# Patient Record
Sex: MALE | Race: WHITE
[De-identification: names, ages, dates, MRNs, and addresses within clinical notes are randomized per-mention and may not be internally consistent; named-entity substitution may affect disease eponyms.]

---

## 2022-10-21 ENCOUNTER — HOSPITAL ENCOUNTER (INPATIENT)
Dept: HOSPITAL 4 - SED | Age: 87
LOS: 3 days | Discharge: ANOTHER HOSPITAL | DRG: 871 | End: 2022-10-24
Attending: INTERNAL MEDICINE | Admitting: INTERNAL MEDICINE
Payer: MEDICARE

## 2022-10-21 VITALS — SYSTOLIC BLOOD PRESSURE: 103 MMHG

## 2022-10-21 VITALS — HEIGHT: 64 IN | WEIGHT: 115 LBS | BODY MASS INDEX: 19.63 KG/M2

## 2022-10-21 VITALS — SYSTOLIC BLOOD PRESSURE: 127 MMHG

## 2022-10-21 VITALS — SYSTOLIC BLOOD PRESSURE: 101 MMHG

## 2022-10-21 VITALS — SYSTOLIC BLOOD PRESSURE: 110 MMHG

## 2022-10-21 VITALS — SYSTOLIC BLOOD PRESSURE: 106 MMHG

## 2022-10-21 VITALS — SYSTOLIC BLOOD PRESSURE: 107 MMHG

## 2022-10-21 VITALS — SYSTOLIC BLOOD PRESSURE: 118 MMHG

## 2022-10-21 DIAGNOSIS — R13.10: ICD-10-CM

## 2022-10-21 DIAGNOSIS — E86.0: ICD-10-CM

## 2022-10-21 DIAGNOSIS — J96.01: ICD-10-CM

## 2022-10-21 DIAGNOSIS — N40.0: ICD-10-CM

## 2022-10-21 DIAGNOSIS — E43: ICD-10-CM

## 2022-10-21 DIAGNOSIS — I11.0: ICD-10-CM

## 2022-10-21 DIAGNOSIS — Z20.822: ICD-10-CM

## 2022-10-21 DIAGNOSIS — Z93.1: ICD-10-CM

## 2022-10-21 DIAGNOSIS — E87.6: ICD-10-CM

## 2022-10-21 DIAGNOSIS — N17.0: ICD-10-CM

## 2022-10-21 DIAGNOSIS — G93.40: ICD-10-CM

## 2022-10-21 DIAGNOSIS — D63.8: ICD-10-CM

## 2022-10-21 DIAGNOSIS — F02.80: ICD-10-CM

## 2022-10-21 DIAGNOSIS — Z86.73: ICD-10-CM

## 2022-10-21 DIAGNOSIS — E78.5: ICD-10-CM

## 2022-10-21 DIAGNOSIS — G20: ICD-10-CM

## 2022-10-21 DIAGNOSIS — J69.0: ICD-10-CM

## 2022-10-21 DIAGNOSIS — I50.43: ICD-10-CM

## 2022-10-21 DIAGNOSIS — Z74.01: ICD-10-CM

## 2022-10-21 DIAGNOSIS — A41.9: Primary | ICD-10-CM

## 2022-10-21 LAB
ALBUMIN SERPL BCP-MCNC: 2.3 G/DL (ref 3.4–4.8)
ALT SERPL W P-5'-P-CCNC: 0 U/L (ref 12–78)
ANION GAP SERPL CALCULATED.3IONS-SCNC: 10 MMOL/L (ref 5–15)
APPEARANCE UR: CLEAR
AST SERPL W P-5'-P-CCNC: 25 U/L (ref 10–37)
BASOPHILS NFR BLD MANUAL: 0 % (ref 0–2)
BILIRUB SERPL-MCNC: 0.6 MG/DL (ref 0–1)
BILIRUB UR QL STRIP: NEGATIVE
BUN SERPL-MCNC: 43 MG/DL (ref 8–21)
CALCIUM SERPL-MCNC: 9.1 MG/DL (ref 8.4–11)
CHLORIDE SERPL-SCNC: 103 MMOL/L (ref 98–107)
COLOR UR: YELLOW
CREAT SERPL-MCNC: 1.31 MG/DL (ref 0.55–1.3)
EOSINOPHIL NFR BLD MANUAL: 0 % (ref 0–7)
ERYTHROCYTE [DISTWIDTH] IN BLOOD BY AUTOMATED COUNT: 14 % (ref 9–15)
GFR SERPL CREATININE-BSD FRML MDRD: (no result) ML/MIN (ref 90–?)
GLUCOSE SERPL-MCNC: 254 MG/DL (ref 70–99)
GLUCOSE UR STRIP-MCNC: NEGATIVE MG/DL
HCT VFR BLD AUTO: 35.3 % (ref 36–54)
HGB BLD-MCNC: 11.7 G/DL (ref 14–18)
HGB UR QL STRIP: NEGATIVE
INR PPP: 1.1 (ref 0.8–1.2)
KETONES UR STRIP-MCNC: (no result) MG/DL
LEUKOCYTE ESTERASE UR QL STRIP: NEGATIVE
LYMPHOCYTES NFR BLD MANUAL: 5 % (ref 20–46)
MCH RBC QN AUTO: 31 PG (ref 27–31)
MCHC RBC AUTO-ENTMCNC: 33 % (ref 32–36)
MCV RBC AUTO: 93 FL (ref 79–98)
MONOCYTES # BLD MANUAL: 3 % (ref 0–11)
NEUTS BAND NFR BLD MANUAL: 6 % (ref 0–6)
NITRITE UR QL STRIP: NEGATIVE
PH UR STRIP: 5.5 [PH] (ref 5–8)
PLATELET # BLD AUTO: 226 K/UL (ref 130–430)
POTASSIUM SERPL-SCNC: 3.4 MMOL/L (ref 3.5–5.1)
PROT UR QL STRIP: NEGATIVE
PROTHROMBIN TIME: 11.4 SECS (ref 9.5–12.5)
RBC # BLD AUTO: 3.79 MIL/UL (ref 4.2–6.2)
SP GR UR STRIP: 1.02 (ref 1–1.03)
UROBILINOGEN UR STRIP-MCNC: 0.2 MG/DL (ref 0.2–1)
WBC # BLD AUTO: 16.3 K/UL (ref 4.8–10.8)

## 2022-10-21 PROCEDURE — C9113 INJ PANTOPRAZOLE SODIUM, VIA: HCPCS

## 2022-10-21 PROCEDURE — 0BH17EZ INSERTION OF ENDOTRACHEAL AIRWAY INTO TRACHEA, VIA NATURAL OR ARTIFICIAL OPENING: ICD-10-PCS | Performed by: INTERNAL MEDICINE

## 2022-10-21 PROCEDURE — 5A1945Z RESPIRATORY VENTILATION, 24-96 CONSECUTIVE HOURS: ICD-10-PCS | Performed by: INTERNAL MEDICINE

## 2022-10-21 RX ADMIN — IPRATROPIUM BROMIDE AND ALBUTEROL SULFATE SCH ML: .5; 3 SOLUTION RESPIRATORY (INHALATION) at 19:00

## 2022-10-21 RX ADMIN — DONEPEZIL HYDROCHLORIDE SCH MG: 5 TABLET, FILM COATED ORAL at 21:23

## 2022-10-21 RX ADMIN — METHYLPREDNISOLONE SCH MG: 40 INJECTION, POWDER, LYOPHILIZED, FOR SOLUTION INTRAMUSCULAR; INTRAVENOUS at 21:23

## 2022-10-21 RX ADMIN — POTASSIUM CHLORIDE SCH MEQ: 1.5 POWDER, FOR SOLUTION ORAL at 21:23

## 2022-10-21 RX ADMIN — CARBIDOPA AND LEVODOPA SCH TAB: 25; 100 TABLET ORAL at 21:23

## 2022-10-21 RX ADMIN — IPRATROPIUM BROMIDE AND ALBUTEROL SULFATE SCH ML: .5; 3 SOLUTION RESPIRATORY (INHALATION) at 23:56

## 2022-10-21 NOTE — NUR
Ammon Durham ( Caregiver at Home) Patient had SOB, and fever x 2 days prior to EMS 
being called. Ammon Durham Tylenol given x2 10/20.2022 HS for fever. Caregiver 
states patient was on pallative care while at home.

## 2022-10-21 NOTE — NUR
RSI done at bedside by Dr. Campos, 20mg Etomidate followed by 100mg Memo, patient 
intubated with 7.5 ETT 26 at the lip. AC 16, 450, 5, 100%.

## 2022-10-21 NOTE — NUR
Jessa Infante [phone] ( Daughter) updates given, daughter coming to 
hospital to see patient and discuss plan of care.

## 2022-10-21 NOTE — NUR
1716-PACU RN reported patient had BP of 95/51. PIP was 124/74. This RN noted that the BP was not within the 20% parametere of preinduction pressure and requested PACU RN repeat the BP and ensure it was within 20% prior to coming to the floor. 1820-Patient arrives on the floor. Initial BP was 86/48. Repeated the BP and it was 94/68 and then 91/65. PACU RN Lizz Johnston to call LADONNA Kemp. 1820 - Patient arrives to unit at this time. Admission completed at this time. Patient is A/O x 4. IV to left hand intact and patent. SCDs applied bilaterally. Mepilex dressing to left hip CDI. Patient denies numbness/tingling. Pedal pulses palpable. Pain 4/10. Patient was oriented to the room to include use of call bell, meal ordering, and use of incentive spirometer patient able to get up to 2000 on IS. Patient was given explanation of \" up for dinner\" program and has verbalized understanding. Phone and call bell left within reach. Plan of care for the day addressed with patient. Educated on pain medication availability and possible side effects. 1853-Per PACU nurse, Esha DOUGHERTY gave order for 500 mL NS bolus and orders to hold all BP medications until v/s are within normal limits. Patient will be admitted to care of ICU MD.  Admitted to  unit.  Will go to 
room .  Belongings list completed.  Complete and up to date summary report 
printed. SBAR report to be given at bedside with opportunity for questions.



FAMILY PRESENT IN WAITING ROOM, FULL REPORT GIVEN TO BEDSIDE RN, ALL QUESTIONS 
ANSWERED AND CARE RELINQUISHED TO ROHAN GREWAL.

## 2022-10-21 NOTE — NUR
Placed in room 1  . Placed on cardiac monitor, blood pressure machine and pulse 
oximeter. To gown for exam. Side rails up.

Report given to ROHAN WILSON AND ROHAN CHAIDEZ.

## 2022-10-21 NOTE — NUR
Admit bed requested 



Patient will be admitted to care of 

Admitted to ICU unit.

Diagnosis Acute Resp Failure 

Inpatient (Yes or No)  Yes

Observation (Yes or No) No

Orientation concerns or request close to nursing station  (Yes or No) No

Covid Status NEG

On vent or bipap Yes

Isolation requirements None

Needs a sitter No

From Home (Yes or if No enter name of facility) Home

Requires Dialysis (Yes or No) No

Med Rec Completed (Yes of No) Yes

## 2022-10-21 NOTE — NUR
OPENING NOTES:

PATIENT TRANSFERRED, RECEIVED REPORT FROM ELIZABETH. PATIENT IS INTUBATED WITH SETTING OF 16, 
450, 5, 50%. LH 20G, 18G RAQUEL PROPOFOL AT 20ML, AND NS 100ML. PATIENT WAS ON HOSPICE AND 
NURSE STATED HE WAS A DNR/DNI. PATIENT HAS G-TUBE THAT IS PATENT. NO SKIN ISSUES. BED IS AT 
THE LOWEST LEVEL, BRAKES ARE LOCKED, SUCTION IS WORKING, 3 SIDE RAILS UP, AND CALL LIGHT 
WITHIN REACH. FAMILY AT BEDSIDE.

## 2022-10-21 NOTE — NUR
CODE STATUS



SPOKE WITH FAMILY REGARDING PTS CODE STATUS. PER PTS DAUGHTER NIMCO AND SON VIANEY GERONIMO PT WILL 
BE MODIFIED CODE ACLS DRUGS ONLY, NO COMPRESSIONS. 



DR. COOPER IS AWARE.

## 2022-10-22 VITALS — SYSTOLIC BLOOD PRESSURE: 115 MMHG

## 2022-10-22 VITALS — SYSTOLIC BLOOD PRESSURE: 112 MMHG

## 2022-10-22 VITALS — SYSTOLIC BLOOD PRESSURE: 114 MMHG

## 2022-10-22 VITALS — SYSTOLIC BLOOD PRESSURE: 107 MMHG

## 2022-10-22 VITALS — SYSTOLIC BLOOD PRESSURE: 124 MMHG

## 2022-10-22 VITALS — SYSTOLIC BLOOD PRESSURE: 110 MMHG

## 2022-10-22 VITALS — SYSTOLIC BLOOD PRESSURE: 103 MMHG

## 2022-10-22 VITALS — SYSTOLIC BLOOD PRESSURE: 120 MMHG

## 2022-10-22 VITALS — SYSTOLIC BLOOD PRESSURE: 125 MMHG

## 2022-10-22 VITALS — SYSTOLIC BLOOD PRESSURE: 117 MMHG

## 2022-10-22 VITALS — SYSTOLIC BLOOD PRESSURE: 127 MMHG

## 2022-10-22 VITALS — SYSTOLIC BLOOD PRESSURE: 119 MMHG

## 2022-10-22 VITALS — SYSTOLIC BLOOD PRESSURE: 132 MMHG

## 2022-10-22 VITALS — SYSTOLIC BLOOD PRESSURE: 121 MMHG

## 2022-10-22 VITALS — SYSTOLIC BLOOD PRESSURE: 128 MMHG

## 2022-10-22 VITALS — SYSTOLIC BLOOD PRESSURE: 133 MMHG

## 2022-10-22 VITALS — SYSTOLIC BLOOD PRESSURE: 129 MMHG

## 2022-10-22 VITALS — SYSTOLIC BLOOD PRESSURE: 113 MMHG

## 2022-10-22 LAB
ALBUMIN SERPL BCP-MCNC: 1.9 G/DL (ref 3.4–4.8)
ALT SERPL W P-5'-P-CCNC: 0 U/L (ref 12–78)
ANION GAP SERPL CALCULATED.3IONS-SCNC: 5 MMOL/L (ref 5–15)
AST SERPL W P-5'-P-CCNC: 19 U/L (ref 10–37)
BASOPHILS # BLD AUTO: 0 K/UL (ref 0–0.2)
BASOPHILS NFR BLD AUTO: 0.1 % (ref 0–2)
BILIRUB SERPL-MCNC: 0.5 MG/DL (ref 0–1)
BUN SERPL-MCNC: 35 MG/DL (ref 8–21)
CALCIUM SERPL-MCNC: 8.7 MG/DL (ref 8.4–11)
CHLORIDE SERPL-SCNC: 106 MMOL/L (ref 98–107)
CREAT SERPL-MCNC: 1.04 MG/DL (ref 0.55–1.3)
EOSINOPHIL # BLD AUTO: 0 K/UL (ref 0–0.4)
EOSINOPHIL NFR BLD AUTO: 0 % (ref 0–4)
ERYTHROCYTE [DISTWIDTH] IN BLOOD BY AUTOMATED COUNT: 14.1 % (ref 9–15)
GFR SERPL CREATININE-BSD FRML MDRD: (no result) ML/MIN (ref 90–?)
GLUCOSE SERPL-MCNC: 278 MG/DL (ref 70–99)
HCT VFR BLD AUTO: 30.3 % (ref 36–54)
HGB BLD-MCNC: 10.1 G/DL (ref 14–18)
LYMPHOCYTES # BLD AUTO: 0.9 K/UL (ref 1–5.5)
LYMPHOCYTES NFR BLD AUTO: 7.7 % (ref 20.5–51.5)
MCH RBC QN AUTO: 31 PG (ref 27–31)
MCHC RBC AUTO-ENTMCNC: 34 % (ref 32–36)
MCV RBC AUTO: 92 FL (ref 79–98)
MONOCYTES # BLD MANUAL: 0.2 K/UL (ref 0–1)
MONOCYTES # BLD MANUAL: 2.1 % (ref 1.7–9.3)
NEUTROPHILS # BLD AUTO: 10.3 K/UL (ref 1.8–7.7)
NEUTROPHILS NFR BLD AUTO: 90.1 % (ref 40–70)
PLATELET # BLD AUTO: 191 K/UL (ref 130–430)
POTASSIUM SERPL-SCNC: 3.3 MMOL/L (ref 3.5–5.1)
RBC # BLD AUTO: 3.28 MIL/UL (ref 4.2–6.2)
WBC # BLD AUTO: 11.4 K/UL (ref 4.8–10.8)

## 2022-10-22 RX ADMIN — LEVOFLOXACIN SCH MLS/HR: 250 INJECTION, SOLUTION INTRAVENOUS at 17:06

## 2022-10-22 RX ADMIN — POTASSIUM CHLORIDE SCH MEQ: 1.5 POWDER, FOR SOLUTION ORAL at 09:25

## 2022-10-22 RX ADMIN — IPRATROPIUM BROMIDE AND ALBUTEROL SULFATE SCH ML: .5; 3 SOLUTION RESPIRATORY (INHALATION) at 15:47

## 2022-10-22 RX ADMIN — METRONIDAZOLE SCH MLS/HR: 500 SOLUTION INTRAVENOUS at 21:12

## 2022-10-22 RX ADMIN — TERAZOSIN HYDROCHLORIDE ANHYDROUS SCH MG: 1 CAPSULE ORAL at 09:25

## 2022-10-22 RX ADMIN — POTASSIUM CHLORIDE SCH MEQ: 1.5 POWDER, FOR SOLUTION ORAL at 20:36

## 2022-10-22 RX ADMIN — METHYLPREDNISOLONE SCH MG: 40 INJECTION, POWDER, LYOPHILIZED, FOR SOLUTION INTRAMUSCULAR; INTRAVENOUS at 20:35

## 2022-10-22 RX ADMIN — CLOPIDOGREL BISULFATE SCH MG: 75 TABLET, FILM COATED ORAL at 09:25

## 2022-10-22 RX ADMIN — CARBIDOPA AND LEVODOPA SCH TAB: 25; 100 TABLET ORAL at 14:06

## 2022-10-22 RX ADMIN — IPRATROPIUM BROMIDE AND ALBUTEROL SULFATE SCH ML: .5; 3 SOLUTION RESPIRATORY (INHALATION) at 03:33

## 2022-10-22 RX ADMIN — DONEPEZIL HYDROCHLORIDE SCH MG: 5 TABLET, FILM COATED ORAL at 20:37

## 2022-10-22 RX ADMIN — FUROSEMIDE SCH MG: 10 INJECTION, SOLUTION INTRAMUSCULAR; INTRAVENOUS at 20:36

## 2022-10-22 RX ADMIN — CARVEDILOL SCH MG: 3.12 TABLET, FILM COATED ORAL at 20:37

## 2022-10-22 RX ADMIN — Medication SCH CAP: at 20:36

## 2022-10-22 RX ADMIN — IPRATROPIUM BROMIDE AND ALBUTEROL SULFATE SCH ML: .5; 3 SOLUTION RESPIRATORY (INHALATION) at 11:31

## 2022-10-22 RX ADMIN — ATORVASTATIN CALCIUM SCH MG: 20 TABLET, FILM COATED ORAL at 09:25

## 2022-10-22 RX ADMIN — CARBIDOPA AND LEVODOPA SCH TAB: 25; 100 TABLET ORAL at 20:36

## 2022-10-22 RX ADMIN — METRONIDAZOLE SCH MLS/HR: 500 SOLUTION INTRAVENOUS at 13:21

## 2022-10-22 RX ADMIN — METRONIDAZOLE SCH MLS/HR: 500 SOLUTION INTRAVENOUS at 05:35

## 2022-10-22 RX ADMIN — ENOXAPARIN SODIUM SCH MG: 30 INJECTION SUBCUTANEOUS at 09:26

## 2022-10-22 RX ADMIN — IPRATROPIUM BROMIDE AND ALBUTEROL SULFATE SCH ML: .5; 3 SOLUTION RESPIRATORY (INHALATION) at 19:40

## 2022-10-22 RX ADMIN — IPRATROPIUM BROMIDE AND ALBUTEROL SULFATE SCH ML: .5; 3 SOLUTION RESPIRATORY (INHALATION) at 07:14

## 2022-10-22 RX ADMIN — CARBIDOPA AND LEVODOPA SCH TAB: 25; 100 TABLET ORAL at 09:25

## 2022-10-22 RX ADMIN — POTASSIUM CHLORIDE SCH MEQ: 1.5 POWDER, FOR SOLUTION ORAL at 14:05

## 2022-10-22 RX ADMIN — IPRATROPIUM BROMIDE AND ALBUTEROL SULFATE SCH ML: .5; 3 SOLUTION RESPIRATORY (INHALATION) at 23:10

## 2022-10-22 RX ADMIN — METHYLPREDNISOLONE SCH MG: 40 INJECTION, POWDER, LYOPHILIZED, FOR SOLUTION INTRAMUSCULAR; INTRAVENOUS at 09:25

## 2022-10-22 RX ADMIN — Medication SCH CAP: at 09:25

## 2022-10-22 RX ADMIN — METRONIDAZOLE SCH MLS/HR: 500 SOLUTION INTRAVENOUS at 00:19

## 2022-10-22 NOTE — NUR
Dietitian Recommendations



* Vital AF 1.2 at 35 ml/hr (goal rate), Prosource BID, Free Water Flush: 100 ml Q8h (per 
physician d/t LEWIS/CHF) via GT

Provides (w/ current propofol infusion rate): 1376 kcal/day, 93 gm protein/day, and 981 ml 
free water/day

Meets (w/ current propofol infusion rate): 100% of estimated caloric needs and 104% of upper 
end of estimated protein needs

LP, MS, RD



Please refer to Nutrition Assessment for details.

-------------------------------------------------------------------------------

Addendum: 10/22/22 at 1251 by Eulalia Castellanos RD

-------------------------------------------------------------------------------

Amended: Links added.

## 2022-10-22 NOTE — NUR
RECV'D PT FROM NOC RN. PT CONT ON VENTILATOR WITH FIO2 AT 30%, PT RR AT 16. PRIOR ABG WNL. 
PT ON PROPOFOL AT 30MCG/KG/MIN. TITRATED OFF PROPOFOL, RT PLACED PT ON CPAP AT 1150, 
TOLERATING WELL WITH NO RESP DISTRESS NOTED. PT WITH PEG, PATENT, VERIFIED PLACEMENT. VITAL 
AF 1.2 RECOMMENDED AND INFUSING NOW. IV ACCESS X 2 PATENT WITHOUT INFILTRATE NOTED. SPOKE 
WITH NIMCO, DAUGHTER WITH POA. INFORMED NIMCO ON INSERTION OF PICC LINE. EXPLAINED RISKS AND 
BENEFITS. AT THIS TIME NIMCO DOES NOT CONSENT TO PICC LINE INSERTION. ET TUBE PULLED BACK 2 
CM PER CXR RESULTS. LUNG SOUNDS WITH FINE CRACKLES TO RIGHT LOWER LOBE. DR. COOPER ROUNDED 
WITH LASIX ORDERED. ALL NEEDS ATTENDED TO. CALL LIGHT IN REACH. WILL CONT TO MONITOR.

## 2022-10-22 NOTE — NUR
OPENING NOTES:

RECEIVED BEDSIDE REPORT FROM [place]. PATIENT RESPONDS TO TOUCH AND WILL MAKE AN ATTEMPT TO 
OPEN EYES. INTUBATE; VENT SETTING ARE AC 16, 450, 30%, 5, ET TUBE WAS PULLED BACK 2 CM TODAY 
AND O2 IS 98%. G-TUBE WITH VITAL AF AT 35 ML/HR. LH 20 G WITH D5LR RUNNING AT 50 ML/HR, RAQUEL 
18G SALINE LOCKED. NO SKIN ISSUES. PORTILLO CATH DRAINAGE TO GRAVITY. CPAP TRAILS WERE DONE 
TODAY AND PATIENT TOLERATED IT WELL. BED IS AT THE LOWEST LEVEL, BRAKES ARE LOCKED, SUCTION 
IS WORKING, 3 SIDE RAILS UP, AND CALL LIGHT WITHIN REACH.

## 2022-10-22 NOTE — NUR
CONSULTATION PAGED/CALLED

Reason for Consultation: ELEVATED TROPONIN

Person Who was Notified: MANJU

Consulting Physician: DR. CHAPMAN

Consultant Specialty: CARDIOLOGY

Ordering Physician: DR. COOPER



Reason for Consultation: RESPIRATORY FAILURE

Person Who was Notified: JOCELIN

Consulting Physician: DR. VANN

Consultant Specialty: PULMONOLOGY

Ordering Physician: DR. COOPER

## 2022-10-22 NOTE — NUR
PULLED BACK ETT 2cm, ETT 24cm AT LIP LINE. PLACED ON CPAP 5, PS 10 PER MD COOPER. RSBI 36, 
SPONTANEOUS RR 21, VT 429ML, SPO2 98%, AND HR 89. PATIENT TOLERATING WELL. RN AWARE.

## 2022-10-23 VITALS — SYSTOLIC BLOOD PRESSURE: 129 MMHG

## 2022-10-23 VITALS — SYSTOLIC BLOOD PRESSURE: 126 MMHG

## 2022-10-23 VITALS — SYSTOLIC BLOOD PRESSURE: 124 MMHG

## 2022-10-23 VITALS — SYSTOLIC BLOOD PRESSURE: 123 MMHG

## 2022-10-23 VITALS — SYSTOLIC BLOOD PRESSURE: 118 MMHG

## 2022-10-23 VITALS — SYSTOLIC BLOOD PRESSURE: 121 MMHG

## 2022-10-23 VITALS — SYSTOLIC BLOOD PRESSURE: 128 MMHG

## 2022-10-23 VITALS — SYSTOLIC BLOOD PRESSURE: 107 MMHG

## 2022-10-23 VITALS — SYSTOLIC BLOOD PRESSURE: 137 MMHG

## 2022-10-23 VITALS — SYSTOLIC BLOOD PRESSURE: 116 MMHG

## 2022-10-23 VITALS — SYSTOLIC BLOOD PRESSURE: 140 MMHG

## 2022-10-23 VITALS — SYSTOLIC BLOOD PRESSURE: 115 MMHG

## 2022-10-23 VITALS — SYSTOLIC BLOOD PRESSURE: 136 MMHG

## 2022-10-23 VITALS — SYSTOLIC BLOOD PRESSURE: 125 MMHG

## 2022-10-23 VITALS — SYSTOLIC BLOOD PRESSURE: 130 MMHG

## 2022-10-23 VITALS — SYSTOLIC BLOOD PRESSURE: 102 MMHG

## 2022-10-23 VITALS — SYSTOLIC BLOOD PRESSURE: 117 MMHG

## 2022-10-23 LAB
ALBUMIN SERPL BCP-MCNC: 1.7 G/DL (ref 3.4–4.8)
ALT SERPL W P-5'-P-CCNC: 12 U/L (ref 12–78)
ANION GAP SERPL CALCULATED.3IONS-SCNC: 8 MMOL/L (ref 5–15)
AST SERPL W P-5'-P-CCNC: 37 U/L (ref 10–37)
BASOPHILS # BLD AUTO: 0 K/UL (ref 0–0.2)
BASOPHILS NFR BLD AUTO: 0.2 % (ref 0–2)
BILIRUB SERPL-MCNC: 0.2 MG/DL (ref 0–1)
BUN SERPL-MCNC: 42 MG/DL (ref 8–21)
CALCIUM SERPL-MCNC: 8.2 MG/DL (ref 8.4–11)
CHLORIDE SERPL-SCNC: 108 MMOL/L (ref 98–107)
CREAT SERPL-MCNC: 0.77 MG/DL (ref 0.55–1.3)
EOSINOPHIL # BLD AUTO: 0 K/UL (ref 0–0.4)
EOSINOPHIL NFR BLD AUTO: 0 % (ref 0–4)
ERYTHROCYTE [DISTWIDTH] IN BLOOD BY AUTOMATED COUNT: 13.8 % (ref 9–15)
GFR SERPL CREATININE-BSD FRML MDRD: (no result) ML/MIN (ref 90–?)
GLUCOSE SERPL-MCNC: 390 MG/DL (ref 70–99)
HCT VFR BLD AUTO: 29.8 % (ref 36–54)
HGB BLD-MCNC: 10.1 G/DL (ref 14–18)
LYMPHOCYTES # BLD AUTO: 0.6 K/UL (ref 1–5.5)
LYMPHOCYTES NFR BLD AUTO: 8.3 % (ref 20.5–51.5)
MCH RBC QN AUTO: 31 PG (ref 27–31)
MCHC RBC AUTO-ENTMCNC: 34 % (ref 32–36)
MCV RBC AUTO: 92 FL (ref 79–98)
MONOCYTES # BLD MANUAL: 0.3 K/UL (ref 0–1)
MONOCYTES # BLD MANUAL: 3.6 % (ref 1.7–9.3)
NEUTROPHILS # BLD AUTO: 6.5 K/UL (ref 1.8–7.7)
NEUTROPHILS NFR BLD AUTO: 87.9 % (ref 40–70)
PLATELET # BLD AUTO: 211 K/UL (ref 130–430)
POTASSIUM SERPL-SCNC: 3.4 MMOL/L (ref 3.5–5.1)
RBC # BLD AUTO: 3.25 MIL/UL (ref 4.2–6.2)
WBC # BLD AUTO: 7.4 K/UL (ref 4.8–10.8)

## 2022-10-23 RX ADMIN — ATORVASTATIN CALCIUM SCH MG: 20 TABLET, FILM COATED ORAL at 10:40

## 2022-10-23 RX ADMIN — METRONIDAZOLE SCH MLS/HR: 500 SOLUTION INTRAVENOUS at 21:38

## 2022-10-23 RX ADMIN — CARBIDOPA AND LEVODOPA SCH TAB: 25; 100 TABLET ORAL at 14:21

## 2022-10-23 RX ADMIN — FUROSEMIDE SCH MG: 10 INJECTION, SOLUTION INTRAMUSCULAR; INTRAVENOUS at 10:39

## 2022-10-23 RX ADMIN — IPRATROPIUM BROMIDE AND ALBUTEROL SULFATE SCH ML: .5; 3 SOLUTION RESPIRATORY (INHALATION) at 22:45

## 2022-10-23 RX ADMIN — CARVEDILOL SCH MG: 3.12 TABLET, FILM COATED ORAL at 20:52

## 2022-10-23 RX ADMIN — CLOPIDOGREL BISULFATE SCH MG: 75 TABLET, FILM COATED ORAL at 09:00

## 2022-10-23 RX ADMIN — IPRATROPIUM BROMIDE AND ALBUTEROL SULFATE SCH ML: .5; 3 SOLUTION RESPIRATORY (INHALATION) at 19:45

## 2022-10-23 RX ADMIN — METRONIDAZOLE SCH MLS/HR: 500 SOLUTION INTRAVENOUS at 14:21

## 2022-10-23 RX ADMIN — SODIUM CHLORIDE SCH MG: 9 INJECTION, SOLUTION INTRAVENOUS at 10:37

## 2022-10-23 RX ADMIN — Medication SCH CAP: at 20:52

## 2022-10-23 RX ADMIN — LEVOFLOXACIN SCH MLS/HR: 250 INJECTION, SOLUTION INTRAVENOUS at 17:49

## 2022-10-23 RX ADMIN — CARBIDOPA AND LEVODOPA SCH TAB: 25; 100 TABLET ORAL at 20:52

## 2022-10-23 RX ADMIN — FUROSEMIDE SCH MG: 10 INJECTION, SOLUTION INTRAMUSCULAR; INTRAVENOUS at 20:53

## 2022-10-23 RX ADMIN — TERAZOSIN HYDROCHLORIDE ANHYDROUS SCH MG: 1 CAPSULE ORAL at 10:38

## 2022-10-23 RX ADMIN — DONEPEZIL HYDROCHLORIDE SCH MG: 5 TABLET, FILM COATED ORAL at 20:52

## 2022-10-23 RX ADMIN — Medication SCH CAP: at 09:00

## 2022-10-23 RX ADMIN — CARBIDOPA AND LEVODOPA SCH TAB: 25; 100 TABLET ORAL at 09:00

## 2022-10-23 RX ADMIN — IPRATROPIUM BROMIDE AND ALBUTEROL SULFATE SCH ML: .5; 3 SOLUTION RESPIRATORY (INHALATION) at 07:24

## 2022-10-23 RX ADMIN — METHYLPREDNISOLONE SCH MG: 40 INJECTION, POWDER, LYOPHILIZED, FOR SOLUTION INTRAMUSCULAR; INTRAVENOUS at 20:51

## 2022-10-23 RX ADMIN — POTASSIUM CHLORIDE SCH MEQ: 1.5 POWDER, FOR SOLUTION ORAL at 10:39

## 2022-10-23 RX ADMIN — IPRATROPIUM BROMIDE AND ALBUTEROL SULFATE SCH ML: .5; 3 SOLUTION RESPIRATORY (INHALATION) at 03:56

## 2022-10-23 RX ADMIN — POTASSIUM CHLORIDE SCH MEQ: 1.5 POWDER, FOR SOLUTION ORAL at 14:21

## 2022-10-23 RX ADMIN — POTASSIUM CHLORIDE SCH MEQ: 1.5 POWDER, FOR SOLUTION ORAL at 20:51

## 2022-10-23 RX ADMIN — ENOXAPARIN SODIUM SCH MG: 30 INJECTION SUBCUTANEOUS at 09:00

## 2022-10-23 RX ADMIN — METRONIDAZOLE SCH MLS/HR: 500 SOLUTION INTRAVENOUS at 05:52

## 2022-10-23 RX ADMIN — METHYLPREDNISOLONE SCH MG: 40 INJECTION, POWDER, LYOPHILIZED, FOR SOLUTION INTRAMUSCULAR; INTRAVENOUS at 10:40

## 2022-10-23 RX ADMIN — CARVEDILOL SCH MG: 3.12 TABLET, FILM COATED ORAL at 09:00

## 2022-10-23 NOTE — NUR
OPENING NOTES:

RECEIVED BEDSIDE REPORT FROM [place]. PATIENT RESPONDS TO TOUCH AND OPENS EYES BUT DOES NOT 
TRACK. EXTUBATED TODAY AND ON 3 L NC WITH O2 AT 97%. G-TUBE WITH VITAL AF AT 35 ML/HR, 
PATIENT TO BE SWITCHED TO GLUCERNA 1.5 TOMORROW. BG WAS ELEVATED AND DAY SHIFT MADE MD AWARE 
AND GOT ORDERS.LH 20 G SALINE LOCKED, RAQUEL 18G SALINE LOCKED. RIGHT BUTTOCKS WOUND THAT HAS 
BEEN DOCUMENTED. PORTILLO CATH DRAINAGE TO GRAVITY. BED IS AT THE LOWEST LEVEL, BRAKES ARE 
LOCKED, SUCTION IS WORKING, 3 SIDE RAILS UP, AND CALL LIGHT WITHIN REACH.

## 2022-10-23 NOTE — NUR
0736 TRIED PT ON CPAP. PT APNEIC, PLACED BACK TO ROHAN GILBERT AWARE.

-------------------------------------------------------------------------------

Addendum: 10/23/22 at 0915 by Rachael Ibarra RT

-------------------------------------------------------------------------------

Amended: Links added.

## 2022-10-23 NOTE — NUR
1545 PER DR COOPER ORDER, PT EXTUBATED AND PLACED ON 3L NC. SAT 99% RN AWARE. WILL CONT TO 
MONITOR.

-------------------------------------------------------------------------------

Addendum: 10/23/22 at 1640 by Rachael Ibarra RT

-------------------------------------------------------------------------------

Amended: Links added.

## 2022-10-23 NOTE — NUR
DR COOPER NOTIFIED OF BLOOD SUGAR IN AM LABS-390.  GLUCOSE CHECK AT 1800 . REGULAR 
INSULIN 15 UNITS GIVEN

SQ RT DELTOID AS ORDERED BY DR. COOPER.

NEW ORDERS IN PROCESS.

## 2022-10-24 VITALS — SYSTOLIC BLOOD PRESSURE: 133 MMHG

## 2022-10-24 VITALS — SYSTOLIC BLOOD PRESSURE: 118 MMHG

## 2022-10-24 VITALS — SYSTOLIC BLOOD PRESSURE: 144 MMHG

## 2022-10-24 VITALS — SYSTOLIC BLOOD PRESSURE: 128 MMHG

## 2022-10-24 VITALS — SYSTOLIC BLOOD PRESSURE: 111 MMHG

## 2022-10-24 VITALS — SYSTOLIC BLOOD PRESSURE: 132 MMHG

## 2022-10-24 VITALS — SYSTOLIC BLOOD PRESSURE: 138 MMHG

## 2022-10-24 VITALS — SYSTOLIC BLOOD PRESSURE: 124 MMHG

## 2022-10-24 VITALS — SYSTOLIC BLOOD PRESSURE: 129 MMHG

## 2022-10-24 VITALS — SYSTOLIC BLOOD PRESSURE: 141 MMHG

## 2022-10-24 LAB
ALBUMIN SERPL BCP-MCNC: 2 G/DL (ref 3.4–4.8)
ALT SERPL W P-5'-P-CCNC: 46 U/L (ref 12–78)
ANION GAP SERPL CALCULATED.3IONS-SCNC: 8 MMOL/L (ref 5–15)
AST SERPL W P-5'-P-CCNC: 105 U/L (ref 10–37)
BASOPHILS # BLD AUTO: 0 K/UL (ref 0–0.2)
BASOPHILS NFR BLD AUTO: 0.1 % (ref 0–2)
BILIRUB SERPL-MCNC: 0.1 MG/DL (ref 0–1)
BUN SERPL-MCNC: 46 MG/DL (ref 8–21)
CALCIUM SERPL-MCNC: 8.6 MG/DL (ref 8.4–11)
CHLORIDE SERPL-SCNC: 113 MMOL/L (ref 98–107)
CREAT SERPL-MCNC: 0.76 MG/DL (ref 0.55–1.3)
EOSINOPHIL # BLD AUTO: 0 K/UL (ref 0–0.4)
EOSINOPHIL NFR BLD AUTO: 0 % (ref 0–4)
ERYTHROCYTE [DISTWIDTH] IN BLOOD BY AUTOMATED COUNT: 14 % (ref 9–15)
GFR SERPL CREATININE-BSD FRML MDRD: (no result) ML/MIN (ref 90–?)
GLUCOSE SERPL-MCNC: 230 MG/DL (ref 70–99)
HCT VFR BLD AUTO: 35.1 % (ref 36–54)
HGB BLD-MCNC: 11.8 G/DL (ref 14–18)
LYMPHOCYTES # BLD AUTO: 0.8 K/UL (ref 1–5.5)
LYMPHOCYTES NFR BLD AUTO: 9.1 % (ref 20.5–51.5)
MCH RBC QN AUTO: 31 PG (ref 27–31)
MCHC RBC AUTO-ENTMCNC: 34 % (ref 32–36)
MCV RBC AUTO: 92 FL (ref 79–98)
MONOCYTES # BLD MANUAL: 0.4 K/UL (ref 0–1)
MONOCYTES # BLD MANUAL: 4.9 % (ref 1.7–9.3)
NEUTROPHILS # BLD AUTO: 7.7 K/UL (ref 1.8–7.7)
NEUTROPHILS NFR BLD AUTO: 85.9 % (ref 40–70)
PHOSPHATE SERPL-MCNC: 2.7 MG/DL (ref 2.7–4.5)
PLATELET # BLD AUTO: 250 K/UL (ref 130–430)
POTASSIUM SERPL-SCNC: 4.7 MMOL/L (ref 3.5–5.1)
RBC # BLD AUTO: 3.82 MIL/UL (ref 4.2–6.2)
WBC # BLD AUTO: 9 K/UL (ref 4.8–10.8)

## 2022-10-24 RX ADMIN — Medication SCH EA: at 18:45

## 2022-10-24 RX ADMIN — FUROSEMIDE SCH MG: 10 INJECTION, SOLUTION INTRAMUSCULAR; INTRAVENOUS at 09:26

## 2022-10-24 RX ADMIN — Medication SCH EA: at 05:52

## 2022-10-24 RX ADMIN — SODIUM CHLORIDE SCH MG: 9 INJECTION, SOLUTION INTRAVENOUS at 09:26

## 2022-10-24 RX ADMIN — IPRATROPIUM BROMIDE AND ALBUTEROL SULFATE SCH ML: .5; 3 SOLUTION RESPIRATORY (INHALATION) at 19:46

## 2022-10-24 RX ADMIN — ENOXAPARIN SODIUM SCH MG: 30 INJECTION SUBCUTANEOUS at 09:27

## 2022-10-24 RX ADMIN — HUMAN INSULIN PRN UNITS: 100 INJECTION, SOLUTION SUBCUTANEOUS at 00:23

## 2022-10-24 RX ADMIN — Medication SCH EA: at 13:00

## 2022-10-24 RX ADMIN — METHYLPREDNISOLONE SCH MG: 40 INJECTION, POWDER, LYOPHILIZED, FOR SOLUTION INTRAMUSCULAR; INTRAVENOUS at 09:27

## 2022-10-24 RX ADMIN — IPRATROPIUM BROMIDE AND ALBUTEROL SULFATE SCH ML: .5; 3 SOLUTION RESPIRATORY (INHALATION) at 03:20

## 2022-10-24 RX ADMIN — LEVOFLOXACIN SCH MLS/HR: 250 INJECTION, SOLUTION INTRAVENOUS at 17:57

## 2022-10-24 RX ADMIN — IPRATROPIUM BROMIDE AND ALBUTEROL SULFATE SCH ML: .5; 3 SOLUTION RESPIRATORY (INHALATION) at 15:37

## 2022-10-24 RX ADMIN — TERAZOSIN HYDROCHLORIDE ANHYDROUS SCH MG: 1 CAPSULE ORAL at 09:24

## 2022-10-24 RX ADMIN — IPRATROPIUM BROMIDE AND ALBUTEROL SULFATE SCH ML: .5; 3 SOLUTION RESPIRATORY (INHALATION) at 11:40

## 2022-10-24 RX ADMIN — CARBIDOPA AND LEVODOPA SCH TAB: 25; 100 TABLET ORAL at 09:26

## 2022-10-24 RX ADMIN — Medication SCH CAP: at 09:25

## 2022-10-24 RX ADMIN — METRONIDAZOLE SCH MLS/HR: 500 SOLUTION INTRAVENOUS at 05:45

## 2022-10-24 RX ADMIN — CLOPIDOGREL BISULFATE SCH MG: 75 TABLET, FILM COATED ORAL at 09:25

## 2022-10-24 RX ADMIN — HUMAN INSULIN PRN UNITS: 100 INJECTION, SOLUTION SUBCUTANEOUS at 05:53

## 2022-10-24 RX ADMIN — POTASSIUM CHLORIDE SCH MEQ: 1.5 POWDER, FOR SOLUTION ORAL at 15:00

## 2022-10-24 RX ADMIN — CARBIDOPA AND LEVODOPA SCH TAB: 25; 100 TABLET ORAL at 15:01

## 2022-10-24 RX ADMIN — METRONIDAZOLE SCH MLS/HR: 500 SOLUTION INTRAVENOUS at 15:00

## 2022-10-24 RX ADMIN — IPRATROPIUM BROMIDE AND ALBUTEROL SULFATE SCH ML: .5; 3 SOLUTION RESPIRATORY (INHALATION) at 22:08

## 2022-10-24 RX ADMIN — CARVEDILOL SCH MG: 3.12 TABLET, FILM COATED ORAL at 09:25

## 2022-10-24 RX ADMIN — POTASSIUM CHLORIDE SCH MEQ: 1.5 POWDER, FOR SOLUTION ORAL at 09:27

## 2022-10-24 RX ADMIN — ATORVASTATIN CALCIUM SCH MG: 20 TABLET, FILM COATED ORAL at 09:25

## 2022-10-24 RX ADMIN — IPRATROPIUM BROMIDE AND ALBUTEROL SULFATE SCH ML: .5; 3 SOLUTION RESPIRATORY (INHALATION) at 07:33

## 2022-10-24 RX ADMIN — Medication SCH EA: at 00:21

## 2022-10-24 RX ADMIN — Medication SCH EA: at 00:24

## 2022-10-24 NOTE — NUR
RT NOTES

Called to bedside due to low saturation. Sterile NTS well tolerated, sat improved to mid to 
high 90s

## 2022-10-24 NOTE — NUR
Received a call from [place] they have a bed to transfer the pt. Room 5010 given to me by MARTINA Hathaway [phone]. Report to be called to [phone] and pick scheduled for 9pm by 
[place]. Radiology disc ordered.

## 2022-10-24 NOTE — NUR
SHEETS CHANGED, NEW FOAM DRESSING APPLIED, PORTILLO CATHETER CLEANED WITH CHG WIPES, NEW GOWN, 
PATIENT TOLERATED WELL.

## 2022-10-24 NOTE — NUR
Received a call from Hector ELIZABETH Ríos at [phone] and full UR done. All questions 
answered.

## 2022-11-06 ENCOUNTER — HOSPITAL ENCOUNTER (EMERGENCY)
Dept: HOSPITAL 4 - SED | Age: 87
Discharge: ANOTHER HOSPITAL | End: 2022-11-06
Payer: MEDICARE

## 2022-11-06 VITALS — SYSTOLIC BLOOD PRESSURE: 125 MMHG

## 2022-11-06 VITALS — HEIGHT: 67 IN | BODY MASS INDEX: 22.76 KG/M2 | WEIGHT: 145 LBS

## 2022-11-06 VITALS — SYSTOLIC BLOOD PRESSURE: 130 MMHG

## 2022-11-06 DIAGNOSIS — K21.9: ICD-10-CM

## 2022-11-06 DIAGNOSIS — Z79.899: ICD-10-CM

## 2022-11-06 DIAGNOSIS — D63.8: ICD-10-CM

## 2022-11-06 DIAGNOSIS — I50.9: ICD-10-CM

## 2022-11-06 DIAGNOSIS — J96.21: Primary | ICD-10-CM

## 2022-11-06 DIAGNOSIS — R65.21: ICD-10-CM

## 2022-11-06 DIAGNOSIS — J44.9: ICD-10-CM

## 2022-11-06 DIAGNOSIS — I11.0: ICD-10-CM

## 2022-11-06 DIAGNOSIS — E11.9: ICD-10-CM

## 2022-11-06 DIAGNOSIS — G93.40: ICD-10-CM

## 2022-11-06 DIAGNOSIS — Z20.822: ICD-10-CM

## 2022-11-06 DIAGNOSIS — I21.4: ICD-10-CM

## 2022-11-06 DIAGNOSIS — N17.9: ICD-10-CM

## 2022-11-06 DIAGNOSIS — J18.9: ICD-10-CM

## 2022-11-06 LAB
ALBUMIN SERPL BCP-MCNC: 2.2 G/DL (ref 3.4–4.8)
ALT SERPL W P-5'-P-CCNC: 44 U/L (ref 12–78)
ANION GAP SERPL CALCULATED.3IONS-SCNC: 6 MMOL/L (ref 5–15)
APPEARANCE UR: (no result)
AST SERPL W P-5'-P-CCNC: 72 U/L (ref 10–37)
BACTERIA URNS QL MICRO: (no result) /HPF
BASOPHILS # BLD AUTO: 0.1 K/UL (ref 0–0.2)
BASOPHILS NFR BLD AUTO: 1.2 % (ref 0–2)
BILIRUB SERPL-MCNC: 0.2 MG/DL (ref 0–1)
BILIRUB UR QL STRIP: NEGATIVE
BUN SERPL-MCNC: 35 MG/DL (ref 8–21)
CALCIUM SERPL-MCNC: 8.8 MG/DL (ref 8.4–11)
CHLORIDE SERPL-SCNC: 107 MMOL/L (ref 98–107)
COLOR UR: YELLOW
CREAT SERPL-MCNC: 0.81 MG/DL (ref 0.55–1.3)
EOSINOPHIL # BLD AUTO: 0 K/UL (ref 0–0.4)
EOSINOPHIL NFR BLD AUTO: 0.3 % (ref 0–4)
ERYTHROCYTE [DISTWIDTH] IN BLOOD BY AUTOMATED COUNT: 14.7 % (ref 9–15)
GFR SERPL CREATININE-BSD FRML MDRD: (no result) ML/MIN (ref 90–?)
GLUCOSE SERPL-MCNC: 282 MG/DL (ref 70–99)
GLUCOSE UR STRIP-MCNC: NEGATIVE MG/DL
HCT VFR BLD AUTO: 31.6 % (ref 36–54)
HGB BLD-MCNC: 10.5 G/DL (ref 14–18)
HGB UR QL STRIP: (no result)
INR PPP: 1.1 (ref 0.8–1.2)
KETONES UR STRIP-MCNC: NEGATIVE MG/DL
LEUKOCYTE ESTERASE UR QL STRIP: (no result)
LYMPHOCYTES # BLD AUTO: 1.4 K/UL (ref 1–5.5)
LYMPHOCYTES NFR BLD AUTO: 13.4 % (ref 20.5–51.5)
MCH RBC QN AUTO: 31 PG (ref 27–31)
MCHC RBC AUTO-ENTMCNC: 33 % (ref 32–36)
MCV RBC AUTO: 94 FL (ref 79–98)
MONOCYTES # BLD MANUAL: 0.5 K/UL (ref 0–1)
MONOCYTES # BLD MANUAL: 4.9 % (ref 1.7–9.3)
NEUTROPHILS # BLD AUTO: 8.6 K/UL (ref 1.8–7.7)
NEUTROPHILS NFR BLD AUTO: 80.2 % (ref 40–70)
NITRITE UR QL STRIP: NEGATIVE
PH UR STRIP: 5.5 [PH] (ref 5–8)
PLATELET # BLD AUTO: 234 K/UL (ref 130–430)
PROT UR QL STRIP: (no result)
PROTHROMBIN TIME: 10.8 SECS (ref 9.5–12.5)
RBC # BLD AUTO: 3.38 MIL/UL (ref 4.2–6.2)
RBC #/AREA URNS HPF: (no result) /HPF (ref 0–3)
SP GR UR STRIP: 1.02 (ref 1–1.03)
UROBILINOGEN UR STRIP-MCNC: 0.2 MG/DL (ref 0.2–1)
WBC # BLD AUTO: 10.7 K/UL (ref 4.8–10.8)
WBC #/AREA URNS HPF: (no result) /HPF (ref 0–3)

## 2022-11-06 PROCEDURE — 85025 COMPLETE CBC W/AUTO DIFF WBC: CPT

## 2022-11-06 PROCEDURE — 96361 HYDRATE IV INFUSION ADD-ON: CPT

## 2022-11-06 PROCEDURE — 87426 SARSCOV CORONAVIRUS AG IA: CPT

## 2022-11-06 PROCEDURE — 85730 THROMBOPLASTIN TIME PARTIAL: CPT

## 2022-11-06 PROCEDURE — 80053 COMPREHEN METABOLIC PANEL: CPT

## 2022-11-06 PROCEDURE — 87040 BLOOD CULTURE FOR BACTERIA: CPT

## 2022-11-06 PROCEDURE — 96365 THER/PROPH/DIAG IV INF INIT: CPT

## 2022-11-06 PROCEDURE — 36415 COLL VENOUS BLD VENIPUNCTURE: CPT

## 2022-11-06 PROCEDURE — 96367 TX/PROPH/DG ADDL SEQ IV INF: CPT

## 2022-11-06 PROCEDURE — 81000 URINALYSIS NONAUTO W/SCOPE: CPT

## 2022-11-06 PROCEDURE — 84484 ASSAY OF TROPONIN QUANT: CPT

## 2022-11-06 PROCEDURE — 71045 X-RAY EXAM CHEST 1 VIEW: CPT

## 2022-11-06 PROCEDURE — 85610 PROTHROMBIN TIME: CPT

## 2022-11-06 PROCEDURE — 93005 ELECTROCARDIOGRAM TRACING: CPT

## 2022-11-06 PROCEDURE — 99291 CRITICAL CARE FIRST HOUR: CPT

## 2022-11-06 PROCEDURE — 83605 ASSAY OF LACTIC ACID: CPT

## 2022-11-06 PROCEDURE — 87086 URINE CULTURE/COLONY COUNT: CPT
